# Patient Record
Sex: MALE | Race: ASIAN | NOT HISPANIC OR LATINO
[De-identification: names, ages, dates, MRNs, and addresses within clinical notes are randomized per-mention and may not be internally consistent; named-entity substitution may affect disease eponyms.]

---

## 2021-10-20 ENCOUNTER — APPOINTMENT (OUTPATIENT)
Dept: PHYSICAL MEDICINE AND REHAB | Facility: CLINIC | Age: 41
End: 2021-10-20
Payer: MEDICARE

## 2021-10-20 DIAGNOSIS — R45.86 EMOTIONAL LABILITY: ICD-10-CM

## 2021-10-20 PROCEDURE — 99205 OFFICE O/P NEW HI 60 MIN: CPT

## 2021-10-21 PROBLEM — R45.86 LABILE MOOD: Status: ACTIVE | Noted: 2021-10-21

## 2022-03-24 ENCOUNTER — APPOINTMENT (OUTPATIENT)
Dept: PHYSICAL MEDICINE AND REHAB | Facility: CLINIC | Age: 42
End: 2022-03-24

## 2022-05-05 ENCOUNTER — APPOINTMENT (OUTPATIENT)
Dept: PHYSICAL MEDICINE AND REHAB | Facility: CLINIC | Age: 42
End: 2022-05-05
Payer: MEDICARE

## 2022-05-05 DIAGNOSIS — M21.70 UNEQUAL LIMB LENGTH (ACQUIRED), UNSPECIFIED SITE: ICD-10-CM

## 2022-05-05 DIAGNOSIS — M21.42 FLAT FOOT [PES PLANUS] (ACQUIRED), LEFT FOOT: ICD-10-CM

## 2022-05-05 PROCEDURE — 99214 OFFICE O/P EST MOD 30 MIN: CPT | Mod: GC

## 2023-09-18 ENCOUNTER — APPOINTMENT (OUTPATIENT)
Dept: PHYSICAL MEDICINE AND REHAB | Facility: CLINIC | Age: 43
End: 2023-09-18
Payer: MEDICARE

## 2023-09-18 VITALS
SYSTOLIC BLOOD PRESSURE: 126 MMHG | RESPIRATION RATE: 14 BRPM | HEIGHT: 70 IN | WEIGHT: 170 LBS | HEART RATE: 75 BPM | DIASTOLIC BLOOD PRESSURE: 84 MMHG | BODY MASS INDEX: 24.34 KG/M2

## 2023-09-18 DIAGNOSIS — R29.6 REPEATED FALLS: ICD-10-CM

## 2023-09-18 DIAGNOSIS — R26.9 UNSPECIFIED ABNORMALITIES OF GAIT AND MOBILITY: ICD-10-CM

## 2023-09-18 DIAGNOSIS — M21.372 FOOT DROP, LEFT FOOT: ICD-10-CM

## 2023-09-18 PROCEDURE — 99215 OFFICE O/P EST HI 40 MIN: CPT | Mod: GC

## 2024-04-22 ENCOUNTER — APPOINTMENT (OUTPATIENT)
Dept: PHYSICAL MEDICINE AND REHAB | Facility: CLINIC | Age: 44
End: 2024-04-22
Payer: MEDICARE

## 2024-04-22 VITALS
RESPIRATION RATE: 14 BRPM | DIASTOLIC BLOOD PRESSURE: 85 MMHG | SYSTOLIC BLOOD PRESSURE: 135 MMHG | HEART RATE: 87 BPM | HEIGHT: 71 IN

## 2024-04-22 DIAGNOSIS — R26.9 UNSPECIFIED ABNORMALITIES OF GAIT AND MOBILITY: ICD-10-CM

## 2024-04-22 DIAGNOSIS — R41.89 OTHER SYMPTOMS AND SIGNS INVOLVING COGNITIVE FUNCTIONS AND AWARENESS: ICD-10-CM

## 2024-04-22 DIAGNOSIS — S06.9X6A: ICD-10-CM

## 2024-04-22 DIAGNOSIS — M21.371 FOOT DROP, RIGHT FOOT: ICD-10-CM

## 2024-04-22 DIAGNOSIS — Z78.9 OTHER REDUCED MOBILITY: ICD-10-CM

## 2024-04-22 DIAGNOSIS — Z74.09 OTHER REDUCED MOBILITY: ICD-10-CM

## 2024-04-22 PROCEDURE — 99213 OFFICE O/P EST LOW 20 MIN: CPT | Mod: GC

## 2024-04-22 NOTE — PHYSICAL EXAM
[FreeTextEntry1] : Constitutional: Alert and answering simple questions Neck: Significant kyphosis of upper thoracic spine, Flexed head/neck which improves with active extension. Flexion and head tilt Musculoskeletal: Ambulates with left sided quad cane with variable lenny and ataxia. Pes planus of the left foot. Strength: 4/5 HF bilaterally, 4/5 KE bilaterally, 5/5 KF bilaterally, 1/5 dorsiflexion right, wiggle toes no left Sensation: intact.

## 2024-04-22 NOTE — HISTORY OF PRESENT ILLNESS
[FreeTextEntry1] : HPI:Mr. 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   SUBJECTIVE: Today, the patient presents for follow up. He was last seen in clinic on 9/18/2023. He is accompanied by his mother who is helping with history taking. As per mother, he is becoming more independent (i.e. setting up breakfast, taking walks). He is currently not involved in PT/OT/SLP program and is interested in restarting.    Plan from 9/18/2023:  -Physical Therapy script written in clinic for gait training -AFO re-evaluation in Physical Therapy -No need for imaging at this time -Follow up video visit, 6 weeks.   ROS: Mood: unpredictable Sleep: no issues (8 hours per day), broken sleep Appetite: no issues Bowel/Bladder: no issues Other: no HA, no CP, no changes in vision PT/OT/SLP: no PT/OT/SLP, HEP  Functional Update: Toileting - Independent UBD - CG and setup LBD - CG and setup Transfers - No issues Ambulation  Home without AD  Community with cane Stairs no issues, slowly

## 2024-04-22 NOTE — END OF VISIT
[] : Fellow [FreeTextEntry3] : I have personally seen and examined the patient.  I fully participated in the care of this patient.  I have made amendments to the documentation where necessary, and agree with the history, physical exam, and plan as documented by the Fellow, Dr. Calzada.   RECOMMENDATIONS Continue OT and PT for maintenance and promotion of safety in mobility. Patient places significant weight on left UE for weight bearing. FU   PRN. Answered all questions, patient was pleased with information provided and demonstrated understanding.  [Time Spent: ___ minutes] : I have spent [unfilled] minutes of time on the encounter.